# Patient Record
Sex: MALE | Race: WHITE | NOT HISPANIC OR LATINO | URBAN - METROPOLITAN AREA
[De-identification: names, ages, dates, MRNs, and addresses within clinical notes are randomized per-mention and may not be internally consistent; named-entity substitution may affect disease eponyms.]

---
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
## 2022-05-24 NOTE — PROCEDURE: SHAVE REMOVAL
Path Notes (To The Dermatopathologist): Please check margins. regular rate and rhythm/no rub regular rate and rhythm/no rub no rub regular rate and rhythm/no rub regular rate and rhythm/no rub regular rate and rhythm/no rub regular rate and rhythm/no rub regular rate and rhythm/no rub regular rate and rhythm/no rub regular rate and rhythm/no rub

## 2024-03-10 NOTE — PROCEDURE: SHAVE REMOVAL
Wound Care: Aquaphor
Bill For Surgical Tray: no
Anesthesia Type: 1% lidocaine with 1:100,000 epinephrine
Size Of Lesion In Cm (Required): 0.4
Biopsy Method: Double edge Personna blades
Size Of Margin In Cm (Margins Are Not Added To Billing Dimensions): 0.1
Anesthesia Volume In Cc: 1
Yes
Render Post-Care Instructions In Note?: yes
Notification Instructions: Patient will return to clinic in 2-4 weeks for biopsy results.
Detail Level: Detailed
Consent was obtained from the patient. The risks and benefits to therapy were discussed in detail. Specifically, the risks of infection, scarring, bleeding, prolonged wound healing, incomplete removal, allergy to anesthesia, nerve injury and recurrence were addressed. Prior to the procedure, the treatment site was clearly identified and confirmed by the patient. All components of Universal Protocol/PAUSE Rule completed.
X Size Of Lesion In Cm (Optional): 0
Path Notes (To The Dermatopathologist): Please check margins.
Medical Necessity Information: It is in your best interest to select a reason for this procedure from the list below. All of these items fulfill various CMS LCD requirements except the new and changing color options.
Post-Care Instructions: I reviewed with the patient in detail post-care instructions. Patient is to keep the biopsy site dry overnight, and then apply Aquaphor twice daily until healed. Patient may apply hydrogen peroxide soaks to remove any crusting.
Medical Necessity Clause: This procedure was medically necessary because the lesion that was treated was:
Billing Type: Third-Party Bill
Hemostasis: Aluminum Chloride